# Patient Record
Sex: MALE | Race: WHITE | NOT HISPANIC OR LATINO | Employment: OTHER | URBAN - METROPOLITAN AREA
[De-identification: names, ages, dates, MRNs, and addresses within clinical notes are randomized per-mention and may not be internally consistent; named-entity substitution may affect disease eponyms.]

---

## 2023-09-20 PROBLEM — E11.9 TYPE 2 DIABETES MELLITUS WITHOUT COMPLICATION (H): Status: ACTIVE | Noted: 2021-01-28

## 2023-09-25 ENCOUNTER — TRANSFERRED RECORDS (OUTPATIENT)
Dept: MULTI SPECIALTY CLINIC | Facility: CLINIC | Age: 73
End: 2023-09-25

## 2023-09-25 ENCOUNTER — OFFICE VISIT (OUTPATIENT)
Dept: ENDOCRINOLOGY | Facility: CLINIC | Age: 73
End: 2023-09-25
Payer: COMMERCIAL

## 2023-09-25 VITALS
HEIGHT: 73 IN | OXYGEN SATURATION: 95 % | TEMPERATURE: 95.9 F | RESPIRATION RATE: 16 BRPM | BODY MASS INDEX: 31.45 KG/M2 | DIASTOLIC BLOOD PRESSURE: 77 MMHG | SYSTOLIC BLOOD PRESSURE: 124 MMHG | HEART RATE: 60 BPM | WEIGHT: 237.3 LBS

## 2023-09-25 DIAGNOSIS — E11.9 TYPE 2 DIABETES MELLITUS WITHOUT COMPLICATION, WITHOUT LONG-TERM CURRENT USE OF INSULIN (H): Primary | ICD-10-CM

## 2023-09-25 DIAGNOSIS — I10 PRIMARY HYPERTENSION: ICD-10-CM

## 2023-09-25 LAB
ANION GAP SERPL CALCULATED.3IONS-SCNC: 13 MMOL/L (ref 7–15)
BUN SERPL-MCNC: 20.8 MG/DL (ref 8–23)
CALCIUM SERPL-MCNC: 9.7 MG/DL (ref 8.8–10.2)
CHLORIDE SERPL-SCNC: 104 MMOL/L (ref 98–107)
CREAT SERPL-MCNC: 0.95 MG/DL (ref 0.67–1.17)
CREAT UR-MCNC: 90.2 MG/DL
DEPRECATED HCO3 PLAS-SCNC: 24 MMOL/L (ref 22–29)
EGFRCR SERPLBLD CKD-EPI 2021: 85 ML/MIN/1.73M2
GLUCOSE SERPL-MCNC: 156 MG/DL (ref 70–99)
HBA1C MFR BLD: 7.1 % (ref 0–5.6)
MICROALBUMIN UR-MCNC: <12 MG/L
MICROALBUMIN/CREAT UR: NORMAL MG/G{CREAT}
POTASSIUM SERPL-SCNC: 4.3 MMOL/L (ref 3.4–5.3)
RETINOPATHY: NORMAL
SODIUM SERPL-SCNC: 141 MMOL/L (ref 136–145)

## 2023-09-25 PROCEDURE — 99203 OFFICE O/P NEW LOW 30 MIN: CPT | Performed by: PHYSICIAN ASSISTANT

## 2023-09-25 PROCEDURE — 83036 HEMOGLOBIN GLYCOSYLATED A1C: CPT | Performed by: PHYSICIAN ASSISTANT

## 2023-09-25 PROCEDURE — 80048 BASIC METABOLIC PNL TOTAL CA: CPT | Performed by: PHYSICIAN ASSISTANT

## 2023-09-25 PROCEDURE — 82570 ASSAY OF URINE CREATININE: CPT | Performed by: PHYSICIAN ASSISTANT

## 2023-09-25 PROCEDURE — 36415 COLL VENOUS BLD VENIPUNCTURE: CPT | Performed by: PHYSICIAN ASSISTANT

## 2023-09-25 PROCEDURE — 82043 UR ALBUMIN QUANTITATIVE: CPT | Performed by: PHYSICIAN ASSISTANT

## 2023-09-25 RX ORDER — KETOCONAZOLE 20 MG/G
CREAM TOPICAL
COMMUNITY
Start: 2022-01-18

## 2023-09-25 RX ORDER — LISINOPRIL 10 MG/1
10 TABLET ORAL DAILY
COMMUNITY
Start: 2022-12-05 | End: 2023-09-26 | Stop reason: SINTOL

## 2023-09-25 RX ORDER — ROSUVASTATIN CALCIUM 20 MG/1
1 TABLET, COATED ORAL AT BEDTIME
COMMUNITY
Start: 2023-01-16

## 2023-09-25 RX ORDER — FINASTERIDE 5 MG/1
5 TABLET, FILM COATED ORAL
COMMUNITY
Start: 2023-01-05

## 2023-09-25 RX ORDER — CLOTRIMAZOLE AND BETAMETHASONE DIPROPIONATE 10; .64 MG/G; MG/G
CREAM TOPICAL 2 TIMES DAILY
COMMUNITY

## 2023-09-25 RX ORDER — UBIDECARENONE 50 MG
CAPSULE ORAL
COMMUNITY

## 2023-09-25 RX ORDER — UBIDECARENONE 50 MG
50 CAPSULE ORAL DAILY
COMMUNITY

## 2023-09-25 NOTE — Clinical Note
Please abstract the following data from this visit with this patient into the appropriate field in Epic:  Tests that can be patient reported without a hard copy:  Eye exam with ophthalmology on this date: 09/25/23 Exam Location: [facility] in [place]  Other Tests found in the patient's chart through Chart Review/Care Everywhere:    Note to Abstraction: If this section is blank, no results were found via Chart Review/Care Everywhere.

## 2023-09-25 NOTE — LETTER
9/25/2023         RE: Stef Pelaez  [address]  [place] [address]        Dear Colleague,    Thank you for referring your patient, Stef Pelaez, to the [facility]. Please see a copy of my visit note below.    Assessment/Plan :   Type 2 DM. Stef remains stable on metformin. He wasn't sure if metformin was the best medication, for him. He had heard about other medications, like Ozempic, and he was sure if there were better options. He has not had any adverse effects from the metformin and he feels like it is working well. We discussed some medication options and he is due for routine laboratory testing. I will place an order today. If his A1C is elevated, I will contact him and we may add Jardiance or Ozempic to his medication. We will follow-up based on the results.  HTN. Stef has been on lisinopril for a few years. He was told that it would help with both his blood pressure and his diabetes. However, he has had a chronic cough or clearing of the throat that he thinks started around the time he started taking lisinopril. We discussed other medication options. I will place an order for routine urinary albumin today. I will contact him with the results and we will switch the lisinopril.      I have independently reviewed and interpreted labs, imaging as indicated.      Chief complaint:  Stef is a 72 year old male who comes to our office to establish care for the management of diabetes.    I have reviewed Care Everywhere including [facility], [facility], [facility], [facility], [place], [facility], [facility] , [facility], [place] lab reports, imaging reports and provider notes as indicated.      HISTORY OF PRESENT ILLNESS  Stef was diagnosed with diabetes about 3 yrs ago. His A1C was found to be elevated during an Urgent Care visit for increased urinary frequency. He was started on metformin 500 mg twice daily and it seemed to work well. He remains on metformin 500 mg  twice daily. He monitors his blood sugars with fingerstick testing once daily. He has not had any problems with medication adverse effects and he denies any issues with severe hyperglycemia and/or hypoglycemia.    Along with taking metformin, he has also worked on improving his diet and exercise. He has been able to lose some weight but he would like to lose a bit more. He has not had any problems with numbness/tingling in his feet. He also denies any issues with blurred vision. He had a screening eye exam today. He does have a history of BPH and he currently takes finasteride. He also has a history of HTN and hyperlipidemia. He is currently stable on lisinopril and rosuvastatin.    Endocrine relevant labs are as follows:     Latest Reference Range & Units 09/25/23 14:33   Hemoglobin A1C 0.0 - 5.6 % 7.1 (H)   (H): Data is abnormally high    REVIEW OF SYSTEMS    Endocrine: positive for diabetes  Skin: negative  Eyes: negative for, visual blurring, redness, tearing, itching  Ears/Nose/Throat: positive for feels like he needs to clear his throat, negative for, postnasal drainage, persistent sore throat, hoarseness  Respiratory: No shortness of breath, dyspnea on exertion, cough, or hemoptysis  Cardiovascular: negative for, irregular heart beat, chest pain, lower extremity edema, and exercise intolerance  Gastrointestinal: negative for, nausea, vomiting, constipation, and diarrhea  Genitourinary: negative for, nocturia, dysuria, frequency, and urgency  Musculoskeletal: negative for, muscular weakness, nocturnal cramping, and foot pain  Neurologic: negative for and numbness or tingling of feet  Psychiatric: negative  Hematologic/Lymphatic/Immunologic: negative    Past Medical History  History reviewed. No pertinent past medical history.    Medications  Current Outpatient Medications   Medication Sig Dispense Refill     clotrimazole-betamethasone (LOTRISONE) 1-0.05 % external cream Apply topically 2 times daily        "coenzyme Q-10 (CO-Q10) 50 MG capsule Take 1 capsule (50 mg) by mouth daily       finasteride (PROSCAR) 5 MG tablet Take 5 mg by mouth       ketoconazole (NIZORAL) 2 % external cream Apply topically.       lisinopril (ZESTRIL) 10 MG tablet Take 10 mg by mouth daily       metFORMIN (GLUCOPHAGE) 500 MG tablet Take 500 mg by mouth       Red Yeast Rice 600 MG TABS        rosuvastatin (CRESTOR) 20 MG tablet Take 1 tablet by mouth At Bedtime         Allergies  No Known Allergies      Family History  family history is not on file.    Social History  Social History     Tobacco Use     Smoking status: Never     Smokeless tobacco: Never   Substance Use Topics     Alcohol use: Yes     Drug use: Never       Physical Exam  /77 (BP Location: Left arm, Patient Position: Chair, Cuff Size: Adult Large)   Pulse 60   Temp (!) 95.9  F (35.5  C) (Tympanic)   Resp 16   Ht 1.854 m (6' 1\")   Wt 107.6 kg (237 lb 4.8 oz)   SpO2 95%   BMI 31.31 kg/m    Body mass index is 31.31 kg/m .  GENERAL :  In no apparent distress  SKIN: Normal color, normal temperature, texture.  No hirsutism, alopecia or purple striae.     EYES: PERRL, EOMI, No scleral icterus,  No proptosis, conjunctival redness, stare, retraction  RESP: Lungs clear to auscultation bilaterally  CARDIAC: Regular rate and rhythm, normal S1 S2, without murmurs, rubs or gallops    NEURO: awake, alert, responds appropriately to questions.  Cranial nerves intact.   Moves all extremities; Gait normal.  No tremor of the outstretched hand.    EXTREMITIES: No clubbing, cyanosis or edema.    DATA REVIEW  Pt did not bring his meter.        Again, thank you for allowing me to participate in the care of your patient.        Sincerely,        Fiona Vitale PA-C  "

## 2023-09-25 NOTE — PROGRESS NOTES
Assessment/Plan :   Type 2 DM. Stef remains stable on metformin. He wasn't sure if metformin was the best medication, for him. He had heard about other medications, like Ozempic, and he was sure if there were better options. He has not had any adverse effects from the metformin and he feels like it is working well. We discussed some medication options and he is due for routine laboratory testing. I will place an order today. If his A1C is elevated, I will contact him and we may add Jardiance or Ozempic to his medication. We will follow-up based on the results.  HTN. Stef has been on lisinopril for a few years. He was told that it would help with both his blood pressure and his diabetes. However, he has had a chronic cough or clearing of the throat that he thinks started around the time he started taking lisinopril. We discussed other medication options. I will place an order for routine urinary albumin today. I will contact him with the results and we will switch the lisinopril.      I have independently reviewed and interpreted labs, imaging as indicated.      Chief complaint:  Stef is a 72 year old male who comes to our office to establish care for the management of diabetes.    I have reviewed Care Everywhere including [facility], [facility], [facility], [facility], [facility] , [facility], [place] lab reports, imaging reports and provider notes as indicated.      HISTORY OF PRESENT ILLNESS  Stef was diagnosed with diabetes about 3 yrs ago. His A1C was found to be elevated during an Urgent Care visit for increased urinary frequency. He was started on metformin 500 mg twice daily and it seemed to work well. He remains on metformin 500 mg twice daily. He monitors his blood sugars with fingerstick testing once daily. He has not had any problems with medication adverse effects and he denies any issues with severe hyperglycemia and/or hypoglycemia.    Along with taking metformin, he has  also worked on improving his diet and exercise. He has been able to lose some weight but he would like to lose a bit more. He has not had any problems with numbness/tingling in his feet. He also denies any issues with blurred vision. He had a screening eye exam today. He does have a history of BPH and he currently takes finasteride. He also has a history of HTN and hyperlipidemia. He is currently stable on lisinopril and rosuvastatin.    Endocrine relevant labs are as follows:     Latest Reference Range & Units 09/25/23 14:33   Hemoglobin A1C 0.0 - 5.6 % 7.1 (H)   (H): Data is abnormally high    REVIEW OF SYSTEMS    Endocrine: positive for diabetes  Skin: negative  Eyes: negative for, visual blurring, redness, tearing, itching  Ears/Nose/Throat: positive for feels like he needs to clear his throat, negative for, postnasal drainage, persistent sore throat, hoarseness  Respiratory: No shortness of breath, dyspnea on exertion, cough, or hemoptysis  Cardiovascular: negative for, irregular heart beat, chest pain, lower extremity edema, and exercise intolerance  Gastrointestinal: negative for, nausea, vomiting, constipation, and diarrhea  Genitourinary: negative for, nocturia, dysuria, frequency, and urgency  Musculoskeletal: negative for, muscular weakness, nocturnal cramping, and foot pain  Neurologic: negative for and numbness or tingling of feet  Psychiatric: negative  Hematologic/Lymphatic/Immunologic: negative    Past Medical History  History reviewed. No pertinent past medical history.    Medications  Current Outpatient Medications   Medication Sig Dispense Refill    clotrimazole-betamethasone (LOTRISONE) 1-0.05 % external cream Apply topically 2 times daily      coenzyme Q-10 (CO-Q10) 50 MG capsule Take 1 capsule (50 mg) by mouth daily      finasteride (PROSCAR) 5 MG tablet Take 5 mg by mouth      ketoconazole (NIZORAL) 2 % external cream Apply topically.      lisinopril (ZESTRIL) 10 MG tablet Take 10 mg by mouth  "daily      metFORMIN (GLUCOPHAGE) 500 MG tablet Take 500 mg by mouth      Red Yeast Rice 600 MG TABS       rosuvastatin (CRESTOR) 20 MG tablet Take 1 tablet by mouth At Bedtime         Allergies  No Known Allergies      Family History  family history is not on file.    Social History  Social History     Tobacco Use    Smoking status: Never    Smokeless tobacco: Never   Substance Use Topics    Alcohol use: Yes    Drug use: Never       Physical Exam  /77 (BP Location: Left arm, Patient Position: Chair, Cuff Size: Adult Large)   Pulse 60   Temp (!) 95.9  F (35.5  C) (Tympanic)   Resp 16   Ht 1.854 m (6' 1\")   Wt 107.6 kg (237 lb 4.8 oz)   SpO2 95%   BMI 31.31 kg/m    Body mass index is 31.31 kg/m .  GENERAL :  In no apparent distress  SKIN: Normal color, normal temperature, texture.  No hirsutism, alopecia or purple striae.     EYES: PERRL, EOMI, No scleral icterus,  No proptosis, conjunctival redness, stare, retraction  RESP: Lungs clear to auscultation bilaterally  CARDIAC: Regular rate and rhythm, normal S1 S2, without murmurs, rubs or gallops    NEURO: awake, alert, responds appropriately to questions.  Cranial nerves intact.   Moves all extremities; Gait normal.  No tremor of the outstretched hand.    EXTREMITIES: No clubbing, cyanosis or edema.    DATA REVIEW  Pt did not bring his meter.      "

## 2023-09-26 ENCOUNTER — TELEPHONE (OUTPATIENT)
Dept: ENDOCRINOLOGY | Facility: CLINIC | Age: 73
End: 2023-09-26
Payer: COMMERCIAL

## 2023-09-26 DIAGNOSIS — I10 PRIMARY HYPERTENSION: ICD-10-CM

## 2023-09-26 DIAGNOSIS — E11.9 TYPE 2 DIABETES MELLITUS WITHOUT COMPLICATION, WITHOUT LONG-TERM CURRENT USE OF INSULIN (H): Primary | ICD-10-CM

## 2023-09-26 RX ORDER — LOSARTAN POTASSIUM 25 MG/1
25 TABLET ORAL DAILY
Qty: 90 TABLET | Refills: 1 | Status: SHIPPED | OUTPATIENT
Start: 2023-09-26 | End: 2024-04-08

## 2023-09-26 NOTE — TELEPHONE ENCOUNTER
----- Message from Fiona Vitale PA-C sent at 9/26/2023  8:00 AM CDT -----  Stef,  Your labs look okay. Your A1C is at 7.1%. I would like it to be under 7%. I'm going to send in a new prescription for Jardiance 10 mg daily. I think this medication would be the best addition to the metformin. Also, I'm going to switch your lisinopril to losartan, to see if that helps with the cough. Let's follow-up in 6 mos.    Thanks,  Fiona

## 2024-03-30 ENCOUNTER — TELEPHONE (OUTPATIENT)
Dept: ENDOCRINOLOGY | Facility: CLINIC | Age: 74
End: 2024-03-30
Payer: COMMERCIAL

## 2024-03-30 DIAGNOSIS — E11.9 TYPE 2 DIABETES MELLITUS WITHOUT COMPLICATION, WITHOUT LONG-TERM CURRENT USE OF INSULIN (H): ICD-10-CM

## 2024-03-30 DIAGNOSIS — I10 PRIMARY HYPERTENSION: ICD-10-CM

## 2024-04-08 RX ORDER — EMPAGLIFLOZIN 10 MG/1
10 TABLET, FILM COATED ORAL DAILY
Qty: 90 TABLET | Refills: 1 | Status: SHIPPED | OUTPATIENT
Start: 2024-04-08 | End: 2024-10-07

## 2024-04-08 RX ORDER — LOSARTAN POTASSIUM 25 MG/1
25 TABLET ORAL DAILY
Qty: 90 TABLET | Refills: 1 | Status: SHIPPED | OUTPATIENT
Start: 2024-04-08

## 2024-04-08 NOTE — TELEPHONE ENCOUNTER
04.08- lmtcb x1- Pt needs a first available appt for refills. If pt wants labs prior please inform me so I can place orders.

## 2024-04-08 NOTE — TELEPHONE ENCOUNTER
Requested Prescriptions   Pending Prescriptions Disp Refills    JARDIANCE 10 MG TABS tablet [Pharmacy Med Name: JARDIANCE 10MG TABLETS] 90 tablet 1     Sig: TAKE 1 TABLET(10 MG) BY MOUTH DAILY       Sodium Glucose Co-Transport Inhibitor Agents Failed - 4/8/2024  7:36 AM        Failed - Patient has documented A1c within the specified period of time.     If HgbA1C is 8 or greater, it needs to be on file within the past 3 months.  If less than 8, must be on file within the past 6 months.     Recent Labs   Lab Test 09/25/23  1433   A1C 7.1*             Failed - Recent (6 mo) or future (90 days) visit within the authorizing provider's specialty     The patient must have completed an in-person or virtual visit within the past 6 months or has a future visit scheduled within the next 90 days with the authorizing provider s specialty.  Urgent care and e-visits do not quality as an office visit for this protocol.          Passed - Medication is active on med list        Passed - Has GFR on file in past 12 months and most recent value is normal        Passed - Medication indicated for associated diagnosis     Medication is associated with one or more of the following diagnoses:     Diabetic nephropathy, With Albuminuria - Type 2 diabetes mellitus     Disorder of cardiovascular system; Prophylaxis - Type 2 diabetes mellitus     Type 2 diabetes mellitus    Disorder of cardiovascular system; Prophylaxis - Heart failure   Chronic kidney disease, (At risk of progression) to reduce the risk of sustained   estimated GFR decline, end-stage kidney disease, cardiovascular death,   and hospitalization for heart failure     Heart failure, (NYHA class II to IV, reduced ejection fraction) to reduce risk of  cardiovascular death and hospitalization           Passed - Patient is age 18 or older        Passed - Patient has documented normal Potassium within the last 12 mos.     Recent Labs   Lab Test 09/25/23  1433   POTASSIUM 4.3                losartan (COZAAR) 25 MG tablet [Pharmacy Med Name: LOSARTAN 25MG TABLETS] 90 tablet 1     Sig: TAKE 1 TABLET(25 MG) BY MOUTH DAILY       Angiotensin-II Receptors Passed - 4/8/2024  7:36 AM        Passed - Last blood pressure under 140/90 in past 12 months     BP Readings from Last 3 Encounters:   09/25/23 124/77       No data recorded            Passed - Medication is active on med list        Passed - Has GFR on file in past 12 months and most recent value is normal        Passed - Medication indicated for associated diagnosis     The medication is prescribed for one or more of the following conditions:    Chronic Kidney Disease (CDK)    Heart Failure (HF)    Diabetes, Nephropathy   Hypertension    Coronary Artery Disease (CAD)   Raynaud's Disease          Passed - Recent (12 mo) or future (90days) visit within the authorizing provider's specialty     The patient must have completed an in-person or virtual visit within the past 12 months or has a future visit scheduled within the next 90 days with the authorizing provider s specialty.  Urgent care and e-visits do not quality as an office visit for this protocol.          Passed - Patient is age 18 or older        Passed - Normal serum potassium on file in past 12 months     Recent Labs   Lab Test 09/25/23  1433   POTASSIUM 4.3

## 2024-04-08 NOTE — TELEPHONE ENCOUNTER
Pt needs a first available appt for refills. If pt wants labs prior please inform me so I can place orders.

## 2024-09-30 ENCOUNTER — LAB (OUTPATIENT)
Dept: LAB | Facility: CLINIC | Age: 74
End: 2024-09-30
Payer: COMMERCIAL

## 2024-09-30 DIAGNOSIS — I10 PRIMARY HYPERTENSION: ICD-10-CM

## 2024-09-30 DIAGNOSIS — E11.9 TYPE 2 DIABETES MELLITUS WITHOUT COMPLICATION, WITHOUT LONG-TERM CURRENT USE OF INSULIN (H): ICD-10-CM

## 2024-09-30 LAB
ANION GAP SERPL CALCULATED.3IONS-SCNC: 13 MMOL/L (ref 7–15)
BUN SERPL-MCNC: 19.3 MG/DL (ref 8–23)
CALCIUM SERPL-MCNC: 9.6 MG/DL (ref 8.8–10.4)
CHLORIDE SERPL-SCNC: 106 MMOL/L (ref 98–107)
CREAT SERPL-MCNC: 0.81 MG/DL (ref 0.67–1.17)
CREAT UR-MCNC: 50.4 MG/DL
EGFRCR SERPLBLD CKD-EPI 2021: >90 ML/MIN/1.73M2
EST. AVERAGE GLUCOSE BLD GHB EST-MCNC: 160 MG/DL
GLUCOSE SERPL-MCNC: 124 MG/DL (ref 70–99)
HBA1C MFR BLD: 7.2 % (ref 0–5.6)
HCO3 SERPL-SCNC: 22 MMOL/L (ref 22–29)
MICROALBUMIN UR-MCNC: 12.3 MG/L
MICROALBUMIN/CREAT UR: 24.4 MG/G CR (ref 0–17)
POTASSIUM SERPL-SCNC: 4.4 MMOL/L (ref 3.4–5.3)
SODIUM SERPL-SCNC: 141 MMOL/L (ref 135–145)

## 2024-09-30 PROCEDURE — 80048 BASIC METABOLIC PNL TOTAL CA: CPT

## 2024-09-30 PROCEDURE — 83036 HEMOGLOBIN GLYCOSYLATED A1C: CPT

## 2024-09-30 PROCEDURE — 82570 ASSAY OF URINE CREATININE: CPT

## 2024-09-30 PROCEDURE — 36415 COLL VENOUS BLD VENIPUNCTURE: CPT

## 2024-09-30 PROCEDURE — 82043 UR ALBUMIN QUANTITATIVE: CPT

## 2024-10-07 ENCOUNTER — OFFICE VISIT (OUTPATIENT)
Dept: ENDOCRINOLOGY | Facility: CLINIC | Age: 74
End: 2024-10-07
Payer: COMMERCIAL

## 2024-10-07 VITALS
HEIGHT: 73 IN | TEMPERATURE: 96.2 F | RESPIRATION RATE: 16 BRPM | HEART RATE: 55 BPM | SYSTOLIC BLOOD PRESSURE: 121 MMHG | DIASTOLIC BLOOD PRESSURE: 77 MMHG | OXYGEN SATURATION: 94 % | WEIGHT: 236.4 LBS | BODY MASS INDEX: 31.33 KG/M2

## 2024-10-07 DIAGNOSIS — E11.9 TYPE 2 DIABETES MELLITUS WITHOUT COMPLICATION, WITHOUT LONG-TERM CURRENT USE OF INSULIN (H): Primary | ICD-10-CM

## 2024-10-07 PROCEDURE — 99213 OFFICE O/P EST LOW 20 MIN: CPT | Performed by: PHYSICIAN ASSISTANT

## 2024-10-07 PROCEDURE — 99207 PR FOOT EXAM NO CHARGE: CPT | Performed by: PHYSICIAN ASSISTANT

## 2024-10-07 RX ORDER — TADALAFIL 5 MG/1
1 TABLET ORAL DAILY
COMMUNITY

## 2024-10-07 RX ORDER — VIBEGRON 75 MG/1
75 TABLET, FILM COATED ORAL
COMMUNITY
Start: 2024-09-17

## 2024-10-07 RX ORDER — TAMSULOSIN HYDROCHLORIDE 0.4 MG/1
0.4 CAPSULE ORAL
COMMUNITY
Start: 2024-09-17

## 2024-10-07 NOTE — LETTER
10/7/2024      Stef Pelaez  [address] [place] [facility] [address]      Dear Colleague,    Thank you for referring your patient, Stef Pelaez, to the [facility]. Please see a copy of my visit note below.    Assessment/Plan :   Type 2 DM. Stef is doing well. He was a little disappointed that his hemoglobin A1C was over 7%, but not surprised. He is back on Jardiance and metformin and he feels like his blood sugars continue to improve. We reviewed his recent blood sugar log and we discussed his current medications. I do not see any reason to make adjustments. He will continue to work on improving his diet and exercise. We will follow-up in 6 mos.       I have independently reviewed and interpreted labs, imaging as indicated.      Chief complaint:  Stef is a 73 year old male who returns for follow-up of Type 2 DM.    I have reviewed Care Everywhere including [facility], [facility], [place],[facility], [facility], [place], [facility], [facility] , [facility], [place] lab reports, imaging reports and provider notes as indicated.      HISTORY OF PRESENT ILLNESS  Stef is doing well. He was a little upset that his hemoglobin A1C was up but he knows why. He was on a steroid taper due to bronchitis which led to a spike in his blood sugars. He also stopped Jardiance for a few weeks. He had been talking to a representative from [employer] and the pharmacist didn't feel that the Jardiance was warranted, so he stopped it. He then had a follow-up visit with his new primary care provider in September, Dr. Wahl, and he recommended that he restart Jardiance, immediately. Since that time, his blood sugars have improved nicely.    Stef uses fingerstick testing to monitor his blood sugars daily. He checks his blood sugars every morning. His numbers are usually around 130 mg/dl. He has not had any problems with severe hyperglycemia and/or hypoglycemia. He also has not had any problems with blurred  vision or an increase in numbness/tingling in his feet. He states that, overall, he is feeling good.     Stfe was diagnosed with diabetes about 3 yrs ago. His A1C was found to be elevated during an Urgent Care visit for increased urinary frequency. He was started on metformin 500 mg twice daily and it seemed to work well. He does have a history of BPH and he currently takes finasteride. He also has a history of HTN and hyperlipidemia. He is currently stable on lisinopril and rosuvastatin.     Endocrine relevant labs are as follows:   Latest Reference Range & Units 09/30/24 11:00   Hemoglobin A1C 0.0 - 5.6 % 7.2 (H)   (H): Data is abnormally high   Latest Reference Range & Units 09/30/24 11:25   Albumin Urine mg/g Cr 0.00 - 17.00 mg/g Cr 24.40 (H)   (H): Data is abnormally high   Latest Reference Range & Units 09/30/24 11:25   Albumin Urine mg/L mg/L 12.3     REVIEW OF SYSTEMS    Endocrine: positive for diabetes  Skin: negative  Eyes: negative for, visual blurring, redness, tearing  Ears/Nose/Throat: negative  Respiratory: No shortness of breath, dyspnea on exertion, cough, or hemoptysis  Cardiovascular: negative for, chest pain, dyspnea on exertion, lower extremity edema, and exercise intolerance  Gastrointestinal: negative for, nausea, vomiting, constipation, and diarrhea  Genitourinary: negative for, nocturia, dysuria, frequency, and urgency  Musculoskeletal: negative for, muscular weakness, nocturnal cramping, and foot pain  Neurologic: negative for, local weakness, numbness or tingling of hands, and numbness or tingling of feet  Psychiatric: negative  Hematologic/Lymphatic/Immunologic: negative    Past Medical History  No past medical history on file.    Medications  Current Outpatient Medications   Medication Sig Dispense Refill     clotrimazole-betamethasone (LOTRISONE) 1-0.05 % external cream Apply topically 2 times daily       coenzyme Q-10 (CO-Q10) 50 MG capsule Take 1 capsule (50 mg) by mouth daily        "finasteride (PROSCAR) 5 MG tablet Take 5 mg by mouth       JARDIANCE 10 MG TABS tablet TAKE 1 TABLET(10 MG) BY MOUTH DAILY 90 tablet 1     ketoconazole (NIZORAL) 2 % external cream Apply topically.       losartan (COZAAR) 25 MG tablet TAKE 1 TABLET(25 MG) BY MOUTH DAILY 90 tablet 1     metFORMIN (GLUCOPHAGE) 500 MG tablet Take 500 mg by mouth       Red Yeast Rice 600 MG TABS        rosuvastatin (CRESTOR) 20 MG tablet Take 1 tablet by mouth At Bedtime         Allergies  No Known Allergies      Family History  family history is not on file.    Social History  Social History     Tobacco Use     Smoking status: Never     Smokeless tobacco: Never   Substance Use Topics     Alcohol use: Yes     Drug use: Never       Physical Exam  /77 (BP Location: Left arm, Patient Position: Chair, Cuff Size: Adult Large)   Pulse 55   Temp (!) 96.2  F (35.7  C) (Tympanic)   Resp 16   Ht 1.854 m (6' 0.99\")   Wt 107.2 kg (236 lb 6.4 oz)   SpO2 94%   BMI 31.20 kg/m    Body mass index is 31.2 kg/m .  GENERAL :  In no apparent distress  SKIN: Normal color, normal temperature, texture.  No hirsutism, alopecia or purple striae.     EYES: PERRL, EOMI, No scleral icterus,  No proptosis, conjunctival redness, stare, retraction  RESP: Lungs clear to auscultation bilaterally  CARDIAC: Regular rate and rhythm, normal S1 S2, without murmurs, rubs or gallops    NEURO: awake, alert, responds appropriately to questions.  Cranial nerves intact.   Moves all extremities; Gait normal.  No tremor of the outstretched hand.    EXTREMITIES: No clubbing, cyanosis or edema.    DATA REVIEW  [facility] Report  Time in target range 100%  Current Ave  mg/dl        Again, thank you for allowing me to participate in the care of your patient.        Sincerely,        Fiona Vitale PA-C  "

## 2024-10-07 NOTE — PATIENT INSTRUCTIONS
[facility]  Dr Mcneill, Endocrinology Department    [address] [id number]  [place] [address]  Appointment [phone]  Fax: [phone]  [place]: Monday - Thursday         I have reviewed and confirmed nurses' notes...

## 2024-10-07 NOTE — PROGRESS NOTES
Assessment/Plan :   Type 2 DM. Stef is doing well. He was a little disappointed that his hemoglobin A1C was over 7%, but not surprised. He is back on Jardiance and metformin and he feels like his blood sugars continue to improve. We reviewed his recent blood sugar log and we discussed his current medications. I do not see any reason to make adjustments. He will continue to work on improving his diet and exercise. We will follow-up in 6 mos.       I have independently reviewed and interpreted labs, imaging as indicated.      Chief complaint:  Stef is a 73 year old male who returns for follow-up of Type 2 DM.    I have reviewed Care Everywhere including [facility], [facility], [facility], [facility], [facility], [facility] , [facility], [place] lab reports, imaging reports and provider notes as indicated.      HISTORY OF PRESENT ILLNESS  Stef is doing well. He was a little upset that his hemoglobin A1C was up but he knows why. He was on a steroid taper due to bronchitis which led to a spike in his blood sugars. He also stopped Jardiance for a few weeks. He had been talking to a representative from [employer] and the pharmacist didn't feel that the Jardiance was warranted, so he stopped it. He then had a follow-up visit with his new primary care provider in September, Dr. Wahl, and he recommended that he restart Jardiance, immediately. Since that time, his blood sugars have improved nicely.    Stef uses fingerstick testing to monitor his blood sugars daily. He checks his blood sugars every morning. His numbers are usually around 130 mg/dl. He has not had any problems with severe hyperglycemia and/or hypoglycemia. He also has not had any problems with blurred vision or an increase in numbness/tingling in his feet. He states that, overall, he is feeling good.     Stef was diagnosed with diabetes about 3 yrs ago. His A1C was found to be elevated during an Urgent Care visit for increased urinary  frequency. He was started on metformin 500 mg twice daily and it seemed to work well. He does have a history of BPH and he currently takes finasteride. He also has a history of HTN and hyperlipidemia. He is currently stable on lisinopril and rosuvastatin.     Endocrine relevant labs are as follows:   Latest Reference Range & Units 09/30/24 11:00   Hemoglobin A1C 0.0 - 5.6 % 7.2 (H)   (H): Data is abnormally high   Latest Reference Range & Units 09/30/24 11:25   Albumin Urine mg/g Cr 0.00 - 17.00 mg/g Cr 24.40 (H)   (H): Data is abnormally high   Latest Reference Range & Units 09/30/24 11:25   Albumin Urine mg/L mg/L 12.3     REVIEW OF SYSTEMS    Endocrine: positive for diabetes  Skin: negative  Eyes: negative for, visual blurring, redness, tearing  Ears/Nose/Throat: negative  Respiratory: No shortness of breath, dyspnea on exertion, cough, or hemoptysis  Cardiovascular: negative for, chest pain, dyspnea on exertion, lower extremity edema, and exercise intolerance  Gastrointestinal: negative for, nausea, vomiting, constipation, and diarrhea  Genitourinary: negative for, nocturia, dysuria, frequency, and urgency  Musculoskeletal: negative for, muscular weakness, nocturnal cramping, and foot pain  Neurologic: negative for, local weakness, numbness or tingling of hands, and numbness or tingling of feet  Psychiatric: negative  Hematologic/Lymphatic/Immunologic: negative    Past Medical History  No past medical history on file.    Medications  Current Outpatient Medications   Medication Sig Dispense Refill    clotrimazole-betamethasone (LOTRISONE) 1-0.05 % external cream Apply topically 2 times daily      coenzyme Q-10 (CO-Q10) 50 MG capsule Take 1 capsule (50 mg) by mouth daily      finasteride (PROSCAR) 5 MG tablet Take 5 mg by mouth      JARDIANCE 10 MG TABS tablet TAKE 1 TABLET(10 MG) BY MOUTH DAILY 90 tablet 1    ketoconazole (NIZORAL) 2 % external cream Apply topically.      losartan (COZAAR) 25 MG tablet TAKE 1  "TABLET(25 MG) BY MOUTH DAILY 90 tablet 1    metFORMIN (GLUCOPHAGE) 500 MG tablet Take 500 mg by mouth      Red Yeast Rice 600 MG TABS       rosuvastatin (CRESTOR) 20 MG tablet Take 1 tablet by mouth At Bedtime         Allergies  No Known Allergies      Family History  family history is not on file.    Social History  Social History     Tobacco Use    Smoking status: Never    Smokeless tobacco: Never   Substance Use Topics    Alcohol use: Yes    Drug use: Never       Physical Exam  /77 (BP Location: Left arm, Patient Position: Chair, Cuff Size: Adult Large)   Pulse 55   Temp (!) 96.2  F (35.7  C) (Tympanic)   Resp 16   Ht 1.854 m (6' 0.99\")   Wt 107.2 kg (236 lb 6.4 oz)   SpO2 94%   BMI 31.20 kg/m    Body mass index is 31.2 kg/m .  GENERAL :  In no apparent distress  SKIN: Normal color, normal temperature, texture.  No hirsutism, alopecia or purple striae.     EYES: PERRL, EOMI, No scleral icterus,  No proptosis, conjunctival redness, stare, retraction  RESP: Lungs clear to auscultation bilaterally  CARDIAC: Regular rate and rhythm, normal S1 S2, without murmurs, rubs or gallops    NEURO: awake, alert, responds appropriately to questions.  Cranial nerves intact.   Moves all extremities; Gait normal.  No tremor of the outstretched hand.    EXTREMITIES: No clubbing, cyanosis or edema.    DATA REVIEW  [facility] Report  Time in target range 100%  Current Ave  mg/dl      "

## 2025-02-15 ENCOUNTER — TRANSFERRED RECORDS (OUTPATIENT)
Dept: MULTI SPECIALTY CLINIC | Facility: CLINIC | Age: 75
End: 2025-02-15

## 2025-02-15 LAB — RETINOPATHY: NORMAL

## 2025-04-07 DIAGNOSIS — E11.9 TYPE 2 DIABETES MELLITUS WITHOUT COMPLICATION, WITHOUT LONG-TERM CURRENT USE OF INSULIN (H): Primary | ICD-10-CM

## 2025-04-21 ENCOUNTER — LAB (OUTPATIENT)
Dept: LAB | Facility: CLINIC | Age: 75
End: 2025-04-21
Payer: COMMERCIAL

## 2025-04-21 ENCOUNTER — OFFICE VISIT (OUTPATIENT)
Dept: ENDOCRINOLOGY | Facility: CLINIC | Age: 75
End: 2025-04-21
Payer: COMMERCIAL

## 2025-04-21 VITALS
DIASTOLIC BLOOD PRESSURE: 87 MMHG | HEART RATE: 53 BPM | RESPIRATION RATE: 16 BRPM | WEIGHT: 241.8 LBS | TEMPERATURE: 95.9 F | BODY MASS INDEX: 32.05 KG/M2 | OXYGEN SATURATION: 94 % | SYSTOLIC BLOOD PRESSURE: 142 MMHG | HEIGHT: 73 IN

## 2025-04-21 DIAGNOSIS — E11.9 TYPE 2 DIABETES MELLITUS WITHOUT COMPLICATION, WITHOUT LONG-TERM CURRENT USE OF INSULIN (H): ICD-10-CM

## 2025-04-21 DIAGNOSIS — E11.9 TYPE 2 DIABETES MELLITUS WITHOUT COMPLICATION, WITHOUT LONG-TERM CURRENT USE OF INSULIN (H): Primary | ICD-10-CM

## 2025-04-21 LAB
EST. AVERAGE GLUCOSE BLD GHB EST-MCNC: 154 MG/DL
HBA1C MFR BLD: 7 % (ref 0–5.6)

## 2025-04-21 PROCEDURE — 83036 HEMOGLOBIN GLYCOSYLATED A1C: CPT

## 2025-04-21 PROCEDURE — 3079F DIAST BP 80-89 MM HG: CPT | Performed by: PHYSICIAN ASSISTANT

## 2025-04-21 PROCEDURE — 3077F SYST BP >= 140 MM HG: CPT | Performed by: PHYSICIAN ASSISTANT

## 2025-04-21 PROCEDURE — 36415 COLL VENOUS BLD VENIPUNCTURE: CPT

## 2025-04-21 PROCEDURE — 99213 OFFICE O/P EST LOW 20 MIN: CPT | Performed by: PHYSICIAN ASSISTANT

## 2025-04-21 RX ORDER — ROSUVASTATIN CALCIUM 40 MG/1
TABLET, COATED ORAL
COMMUNITY
Start: 2025-04-20

## 2025-04-21 NOTE — LETTER
4/21/2025      Stef Pelaez  [address] [facility] [facility] [id number]      Dear Colleague,    Thank you for referring your patient, Stef Pelaez, to the [facility]. Please see a copy of my visit note below.    Assessment/Plan :   Type 2 DM. Stef knows that his A1C would have been better if he had remained on Jardiance. He is frustrated that his A1C is still at 7% and he would like to get back on Jardiance. The cost did increase from last year. He was previously taking 10 mg daily, so we can send in a new prescription for 1/2 of the 25 mg tablets daily. It will still be $500 but the prescription will last for 6 mos. He understands. If he has any problems after starting the Jardiance, he will contact our office. We will follow-up in 6 mos.       I have independently reviewed and interpreted labs, imaging as indicated.      Chief complaint:  Stef is a 74 year old male who returns for follow-up of type 2 diabetes.    I have reviewed Care Everywhere including [facility], [facility], [facility], [facility], [facility] , [facility], [place] lab reports, imaging reports and provider notes as indicated.      HISTORY OF PRESENT ILLNESS  Stef was frustrated to see that his A1C was still at 7%. He states that things were going great until he ran out of Jardiance. Since our last visit, he started Jardiance 10 mg daily and he had remained on metformin 1000 mg daily. His blood sugars were looking great, with readings around 110 mg/dl. 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 Immediately after the surgery, his blood sugars had dropped even further with readings in the 90s.     He then traveled to [place] and while there he developed an infection at the surgical site. It was around this time that he also ran out of Jardiance. He was given Bactrim to treat the skin infection which led to a skin reaction. He was then given a steroid cream to  help with the skin reaction. He worries that this may have led to a spike in his blood sugars. He tried to refill the Jardiance but he was told that it would be about $500. He wanted to follow-up before spending the money.    Stef was diagnosed with diabetes in 2020. His A1C was found to be elevated during an Urgent Care visit for increased urinary frequency. He was started on metformin 500 mg twice daily and it seemed to work well. He remains on metformin 500 mg twice daily. He does have a history of BPH and he currently takes finasteride. He also has a history of HTN and hyperlipidemia.     Endocrine relevant labs are as follows:   Latest Reference Range & Units 09/30/24 11:00   Hemoglobin A1C 0.0 - 5.6 % 7.2 (H)   (H): Data is abnormally high   Latest Reference Range & Units 09/30/24 11:25   Albumin Urine mg/g Cr 0.00 - 17.00 mg/g Cr 24.40 (H)   (H): Data is abnormally high   Latest Reference Range & Units 09/30/24 11:25   Albumin Urine mg/L mg/L 12.3     REVIEW OF SYSTEMS    Endocrine: positive for diabetes  Skin: negative  Eyes: negative for, visual blurring, redness, tearing  Ears/Nose/Throat: negative  Respiratory: No shortness of breath, dyspnea on exertion, cough, or hemoptysis  Cardiovascular: negative for, chest pain, dyspnea on exertion, lower extremity edema, and exercise intolerance  Gastrointestinal: negative for, nausea, vomiting, constipation, and diarrhea  Genitourinary: negative for, nocturia, dysuria, frequency, and urgency  Musculoskeletal: negative for, muscular weakness, nocturnal cramping, and foot pain  Neurologic: negative for, local weakness, numbness or tingling of hands, and numbness or tingling of feet  Psychiatric: negative  Hematologic/Lymphatic/Immunologic: negative    Past Medical History  No past medical history on file.    Medications  Current Outpatient Medications   Medication Sig Dispense Refill     cholecalciferol (VITAMIN D3) 25 mcg (1000 units) capsule Take 1 capsule by  "mouth daily.       coenzyme Q-10 (CO-Q10) 50 MG capsule Take 1 capsule (50 mg) by mouth daily       empagliflozin (JARDIANCE) 10 MG TABS tablet Take 1 tablet (10 mg) by mouth daily. 90 tablet 1     finasteride (PROSCAR) 5 MG tablet Take 5 mg by mouth       losartan (COZAAR) 25 MG tablet TAKE 1 TABLET(25 MG) BY MOUTH DAILY 90 tablet 1     MAGNESIUM PO Take 200 mg by mouth.       metFORMIN (GLUCOPHAGE) 500 MG tablet Take 500 mg by mouth       Multiple Vitamins-Minerals (ICAPS AREDS 2 PO) Take by mouth.       Probiotic Product (PROBIOTIC DAILY PO) Take by mouth.       rosuvastatin (CRESTOR) 20 MG tablet Take 1 tablet by mouth At Bedtime       Selenium 200 MCG TABS tablet Take 200 mcg by mouth daily.       tadalafil (CIALIS) 5 MG tablet Take 1 tablet by mouth daily.       tamsulosin (FLOMAX) 0.4 MG capsule Take 0.4 mg by mouth.       vibegron (GEMTESA) 75 MG TABS tablet Take 75 mg by mouth.         Allergies  No Known Allergies      Family History  family history is not on file.    Social History  Social History     Tobacco Use     Smoking status: Never     Smokeless tobacco: Never   Substance Use Topics     Alcohol use: Yes     Drug use: Never       Physical Exam  BP (!) 142/87 (BP Location: Left arm, Patient Position: Chair, Cuff Size: Adult Large)   Pulse 53   Temp (!) 95.9  F (35.5  C) (Tympanic)   Resp 16   Ht 1.854 m (6' 0.99\")   Wt 109.7 kg (241 lb 12.8 oz)   SpO2 94%   BMI 31.91 kg/m    Body mass index is 31.91 kg/m .  GENERAL :  In no apparent distress  SKIN: Normal color, normal temperature, texture.  No hirsutism, alopecia or purple striae.     EYES: PERRL, EOMI, No scleral icterus,  No proptosis, conjunctival redness, stare, retraction  RESP: Lungs clear to auscultation bilaterally  CARDIAC: Regular rate and rhythm, normal S1 S2, without murmurs, rubs or gallops    NEURO: awake, alert, responds appropriately to questions.  Cranial nerves intact.   Moves all extremities; Gait normal.  No tremor of the " outstretched hand.   EXTREMITIES: No clubbing, cyanosis or edema.    DATA REVIEW  He did not bring in his glucometer        Again, thank you for allowing me to participate in the care of your patient.        Sincerely,        Fiona Vitale PA-C    Electronically signed

## 2025-04-21 NOTE — PROGRESS NOTES
Assessment/Plan :   Type 2 DM. Stef knows that his A1C would have been better if he had remained on Jardiance. He is frustrated that his A1C is still at 7% and he would like to get back on Jardiance. The cost did increase from last year. He was previously taking 10 mg daily, so we can send in a new prescription for 1/2 of the 25 mg tablets daily. It will still be $500 but the prescription will last for 6 mos. He understands. If he has any problems after starting the Jardiance, he will contact our office. We will follow-up in 6 mos.       I have independently reviewed and interpreted labs, imaging as indicated.      Chief complaint:  Stef is a 74 year old male who returns for follow-up of type 2 diabetes.    I have reviewed Care Everywhere including [facility], [facility], [facility], [facility], [facility] , [facility], [place] lab reports, imaging reports and provider notes as indicated.      HISTORY OF PRESENT ILLNESS  Stef was frustrated to see that his A1C was still at 7%. He states that things were going great until he ran out of Jardiance. Since our last visit, he started Jardiance 10 mg daily and he had remained on metformin 1000 mg daily. His blood sugars were looking great, with readings around 110 mg/dl. 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 Immediately after the surgery, his blood sugars had dropped even further with readings in the 90s.     He then traveled to [place] and while there he developed an infection at the surgical site. It was around this time that he also ran out of Jardiance. He was given Bactrim to treat the skin infection which led to a skin reaction. He was then given a steroid cream to help with the skin reaction. He worries that this may have led to a spike in his blood sugars. He tried to refill the Jardiance but he was told that it would be about $500. He wanted to follow-up before spending the money.    Stef was  diagnosed with diabetes in 2020. His A1C was found to be elevated during an Urgent Care visit for increased urinary frequency. He was started on metformin 500 mg twice daily and it seemed to work well. He remains on metformin 500 mg twice daily. He does have a history of BPH and he currently takes finasteride. He also has a history of HTN and hyperlipidemia.     Endocrine relevant labs are as follows:   Latest Reference Range & Units 09/30/24 11:00   Hemoglobin A1C 0.0 - 5.6 % 7.2 (H)   (H): Data is abnormally high   Latest Reference Range & Units 09/30/24 11:25   Albumin Urine mg/g Cr 0.00 - 17.00 mg/g Cr 24.40 (H)   (H): Data is abnormally high   Latest Reference Range & Units 09/30/24 11:25   Albumin Urine mg/L mg/L 12.3     REVIEW OF SYSTEMS    Endocrine: positive for diabetes  Skin: negative  Eyes: negative for, visual blurring, redness, tearing  Ears/Nose/Throat: negative  Respiratory: No shortness of breath, dyspnea on exertion, cough, or hemoptysis  Cardiovascular: negative for, chest pain, dyspnea on exertion, lower extremity edema, and exercise intolerance  Gastrointestinal: negative for, nausea, vomiting, constipation, and diarrhea  Genitourinary: negative for, nocturia, dysuria, frequency, and urgency  Musculoskeletal: negative for, muscular weakness, nocturnal cramping, and foot pain  Neurologic: negative for, local weakness, numbness or tingling of hands, and numbness or tingling of feet  Psychiatric: negative  Hematologic/Lymphatic/Immunologic: negative    Past Medical History  No past medical history on file.    Medications  Current Outpatient Medications   Medication Sig Dispense Refill    cholecalciferol (VITAMIN D3) 25 mcg (1000 units) capsule Take 1 capsule by mouth daily.      coenzyme Q-10 (CO-Q10) 50 MG capsule Take 1 capsule (50 mg) by mouth daily      empagliflozin (JARDIANCE) 10 MG TABS tablet Take 1 tablet (10 mg) by mouth daily. 90 tablet 1    finasteride (PROSCAR) 5 MG tablet Take 5 mg  "by mouth      losartan (COZAAR) 25 MG tablet TAKE 1 TABLET(25 MG) BY MOUTH DAILY 90 tablet 1    MAGNESIUM PO Take 200 mg by mouth.      metFORMIN (GLUCOPHAGE) 500 MG tablet Take 500 mg by mouth      Multiple Vitamins-Minerals (ICAPS AREDS 2 PO) Take by mouth.      Probiotic Product (PROBIOTIC DAILY PO) Take by mouth.      rosuvastatin (CRESTOR) 20 MG tablet Take 1 tablet by mouth At Bedtime      Selenium 200 MCG TABS tablet Take 200 mcg by mouth daily.      tadalafil (CIALIS) 5 MG tablet Take 1 tablet by mouth daily.      tamsulosin (FLOMAX) 0.4 MG capsule Take 0.4 mg by mouth.      vibegron (GEMTESA) 75 MG TABS tablet Take 75 mg by mouth.         Allergies  No Known Allergies      Family History  family history is not on file.    Social History  Social History     Tobacco Use    Smoking status: Never    Smokeless tobacco: Never   Substance Use Topics    Alcohol use: Yes    Drug use: Never       Physical Exam  BP (!) 142/87 (BP Location: Left arm, Patient Position: Chair, Cuff Size: Adult Large)   Pulse 53   Temp (!) 95.9  F (35.5  C) (Tympanic)   Resp 16   Ht 1.854 m (6' 0.99\")   Wt 109.7 kg (241 lb 12.8 oz)   SpO2 94%   BMI 31.91 kg/m    Body mass index is 31.91 kg/m .  GENERAL :  In no apparent distress  SKIN: Normal color, normal temperature, texture.  No hirsutism, alopecia or purple striae.     EYES: PERRL, EOMI, No scleral icterus,  No proptosis, conjunctival redness, stare, retraction  RESP: Lungs clear to auscultation bilaterally  CARDIAC: Regular rate and rhythm, normal S1 S2, without murmurs, rubs or gallops    NEURO: awake, alert, responds appropriately to questions.  Cranial nerves intact.   Moves all extremities; Gait normal.  No tremor of the outstretched hand.   EXTREMITIES: No clubbing, cyanosis or edema.    DATA REVIEW  He did not bring in his glucometer      "

## 2025-04-21 NOTE — PATIENT INSTRUCTIONS
[facility]  Dr Mcneill, Endocrinology Department    [address] [id number]  [place] [address]  Appointment [phone]  Fax: [phone]  [place]: Monday - Thursday

## 2025-04-21 NOTE — Clinical Note
Please abstract the following data from this visit with this patient into the appropriate field in Epic:  Tests that can be patient reported without a hard copy:  Eye exam with ophthalmology on this date: 02/15/25 Exam Location: [facility]  Other Tests found in the patient's chart through Chart Review/Care Everywhere:    Note to [employer]: If this section is blank, no results were found via Chart Review/Care Everywhere.

## 2025-05-12 ENCOUNTER — TELEPHONE (OUTPATIENT)
Dept: INTERNAL MEDICINE | Facility: CLINIC | Age: 75
End: 2025-05-12
Payer: COMMERCIAL

## 2025-05-12 NOTE — TELEPHONE ENCOUNTER
Medication Question or Refill        What medication are you calling about (include dose and sig)?: Test strips     Preferred Pharmacy:   [id number] - [place], [place] - [address] [address] [facility] [address]  Phone: [phone] Fax: [phone]      Controlled Substance Agreement on file:   CSA -- Patient Level:    CSA: None found at the patient level.       Who prescribed the medication?: Myra Vitale    Do you need a refill? Yes    When did you use the medication last? 5/12/2025    Patient offered an appointment? No    Do you have any questions or concerns?  No      Could we send this information to you in [facility] or would you prefer to receive a phone call?:   Patient would prefer a phone call   Okay to leave a detailed message?: Yes at Cell number on file:    Telephone Information:   Mobile [phone]

## 2025-05-13 NOTE — TELEPHONE ENCOUNTER
I called LM for the pt to c/b to discuss the below.   How many strips is he using per day? Does he need a meter? Lancets?

## 2025-08-04 ENCOUNTER — TELEPHONE (OUTPATIENT)
Dept: PHARMACY | Facility: OTHER | Age: 75
End: 2025-08-04
Payer: COMMERCIAL

## 2025-08-10 ENCOUNTER — HEALTH MAINTENANCE LETTER (OUTPATIENT)
Age: 75
End: 2025-08-10

## 2025-08-25 ENCOUNTER — VIRTUAL VISIT (OUTPATIENT)
Dept: PHARMACY | Facility: CLINIC | Age: 75
End: 2025-08-25
Payer: COMMERCIAL

## 2025-08-25 DIAGNOSIS — Z78.9 TAKES DIETARY SUPPLEMENTS: ICD-10-CM

## 2025-08-25 DIAGNOSIS — E11.9 TYPE 2 DIABETES MELLITUS WITHOUT COMPLICATION, WITHOUT LONG-TERM CURRENT USE OF INSULIN (H): Primary | ICD-10-CM

## 2025-08-25 DIAGNOSIS — I10 BENIGN ESSENTIAL HYPERTENSION: ICD-10-CM

## 2025-08-25 DIAGNOSIS — E78.2 MIXED HYPERLIPIDEMIA: ICD-10-CM

## 2025-08-25 DIAGNOSIS — C60.9: ICD-10-CM

## 2025-08-25 PROCEDURE — 99605 MTMS BY PHARM NP 15 MIN: CPT | Mod: 93

## 2025-08-25 PROCEDURE — 99607 MTMS BY PHARM ADDL 15 MIN: CPT | Mod: 93

## 2025-08-25 RX ORDER — ZINC GLUCONATE 50 MG
50 TABLET ORAL DAILY
COMMUNITY